# Patient Record
Sex: MALE | Race: WHITE | NOT HISPANIC OR LATINO | URBAN - METROPOLITAN AREA
[De-identification: names, ages, dates, MRNs, and addresses within clinical notes are randomized per-mention and may not be internally consistent; named-entity substitution may affect disease eponyms.]

---

## 2018-08-30 ENCOUNTER — OFFICE (OUTPATIENT)
Dept: URBAN - METROPOLITAN AREA CLINIC 101 | Facility: CLINIC | Age: 73
End: 2018-08-30
Payer: COMMERCIAL

## 2018-08-30 VITALS
WEIGHT: 205 LBS | HEIGHT: 71 IN | TEMPERATURE: 97.9 F | DIASTOLIC BLOOD PRESSURE: 85 MMHG | HEART RATE: 70 BPM | SYSTOLIC BLOOD PRESSURE: 155 MMHG

## 2018-08-30 DIAGNOSIS — R43.2 PARAGEUSIA: ICD-10-CM

## 2018-08-30 DIAGNOSIS — K22.70 BARRETT'S ESOPHAGUS WITHOUT DYSPLASIA: ICD-10-CM

## 2018-08-30 DIAGNOSIS — K21.9 GASTRO-ESOPHAGEAL REFLUX DISEASE WITHOUT ESOPHAGITIS: ICD-10-CM

## 2018-08-30 PROCEDURE — 99203 OFFICE O/P NEW LOW 30 MIN: CPT

## 2018-08-30 RX ORDER — ESOMEPRAZOLE MAGNESIUM 40 MG/1
CAPSULE, DELAYED RELEASE ORAL
Qty: 90 | Refills: 3 | Status: COMPLETED
Start: 2018-08-30 | End: 2018-12-18

## 2018-10-23 ENCOUNTER — OFFICE (OUTPATIENT)
Dept: URBAN - METROPOLITAN AREA CLINIC 101 | Facility: CLINIC | Age: 73
End: 2018-10-23
Payer: COMMERCIAL

## 2018-10-23 VITALS
DIASTOLIC BLOOD PRESSURE: 89 MMHG | TEMPERATURE: 97.9 F | HEART RATE: 54 BPM | HEIGHT: 71 IN | SYSTOLIC BLOOD PRESSURE: 146 MMHG | WEIGHT: 207 LBS

## 2018-10-23 DIAGNOSIS — R10.13 EPIGASTRIC PAIN: ICD-10-CM

## 2018-10-23 DIAGNOSIS — K64.9 UNSPECIFIED HEMORRHOIDS: ICD-10-CM

## 2018-10-23 DIAGNOSIS — K22.70 BARRETT'S ESOPHAGUS WITHOUT DYSPLASIA: ICD-10-CM

## 2018-10-23 DIAGNOSIS — R63.0 ANOREXIA: ICD-10-CM

## 2018-10-23 DIAGNOSIS — R14.2 ERUCTATION: ICD-10-CM

## 2018-10-23 DIAGNOSIS — K21.9 GASTRO-ESOPHAGEAL REFLUX DISEASE WITHOUT ESOPHAGITIS: ICD-10-CM

## 2018-10-23 DIAGNOSIS — R43.2 PARAGEUSIA: ICD-10-CM

## 2018-10-23 PROCEDURE — 99214 OFFICE O/P EST MOD 30 MIN: CPT

## 2018-10-23 RX ORDER — HYDROCORTISONE ACETATE 25 MG/1
SUPPOSITORY RECTAL
Qty: 28 | Refills: 3 | Status: ACTIVE
Start: 2018-10-23

## 2018-10-23 NOTE — SERVICEHPINOTES
ZORAIDA CHANG   is a   73   male who complains of stomach pain, acid reflux, and loss of appetite. He notes epigastric pain described as "dull/burning," occurs 2-3x/week, lasting hours, improves with po intake. He has associates symptoms: indigestion, sour taste in mouth, and decreased of appetite (x 4 months ago). He was recently switched from Protonix to Nexium 40 mg qd since 08/30/2018. He states the Nexium has helped the reflux but not improved the excessive belching or stomach pain. He has h/o Miranda's and his last EGD was in [place] 2015/2016. NSAID use chronic including ibuprofen 2 pills q AM x many years taken "on empty stomach" for arthritis. Denies n/v, cough, voice change, dysphagia, odynophagia, melena, blood in stool, melena, change in bowel habits, weight loss. He has h/o hemorrhoids that have been "stable." He takes Metamucil qd that helps bulk up his stools. He has BMs 4x/day, BSS type 4. He mentions mild anal "burning/itching" sensation that occurs 1-2x/week No pain with defecation. He has tried Anusol rx that helps and he requests refills (prefers suppsitories).

## 2018-11-01 ENCOUNTER — ON CAMPUS - OUTPATIENT (OUTPATIENT)
Dept: URBAN - METROPOLITAN AREA HOSPITAL 14 | Facility: HOSPITAL | Age: 73
End: 2018-11-01
Payer: COMMERCIAL

## 2018-11-01 DIAGNOSIS — R14.0 ABDOMINAL DISTENSION (GASEOUS): ICD-10-CM

## 2018-11-01 DIAGNOSIS — K29.60 OTHER GASTRITIS WITHOUT BLEEDING: ICD-10-CM

## 2018-11-01 DIAGNOSIS — K21.9 GASTRO-ESOPHAGEAL REFLUX DISEASE WITHOUT ESOPHAGITIS: ICD-10-CM

## 2018-11-01 DIAGNOSIS — R10.13 EPIGASTRIC PAIN: ICD-10-CM

## 2018-11-01 DIAGNOSIS — K20.8 OTHER ESOPHAGITIS: ICD-10-CM

## 2018-11-01 PROCEDURE — 43239 EGD BIOPSY SINGLE/MULTIPLE: CPT

## 2018-12-18 ENCOUNTER — OFFICE (OUTPATIENT)
Dept: URBAN - METROPOLITAN AREA CLINIC 101 | Facility: CLINIC | Age: 73
End: 2018-12-18
Payer: COMMERCIAL

## 2018-12-18 VITALS
SYSTOLIC BLOOD PRESSURE: 162 MMHG | TEMPERATURE: 97.9 F | HEIGHT: 71 IN | HEART RATE: 63 BPM | WEIGHT: 212 LBS | DIASTOLIC BLOOD PRESSURE: 85 MMHG

## 2018-12-18 DIAGNOSIS — K64.8 OTHER HEMORRHOIDS: ICD-10-CM

## 2018-12-18 DIAGNOSIS — K21.0 GASTRO-ESOPHAGEAL REFLUX DISEASE WITH ESOPHAGITIS: ICD-10-CM

## 2018-12-18 PROCEDURE — 99214 OFFICE O/P EST MOD 30 MIN: CPT

## 2018-12-18 NOTE — SERVICEHPINOTES
ZORAIDA CHANG   is a   73   male who is here for f/u for reflux and hemorrhoids. His recent EGD found mild chronic gastritis bx neg h pylori. He reports Omeprazole 40 mg qd provides well control of GERD as evident by rare breakthrough symptoms. He notes change from Nexium 20 mg to Omeprazole 40 mg has improved his belching and resolved his stomach pains. He also cut down on ibuprofen and tried to use acetaminophen instead of NSAIDs. Denies n/v, dysphagia/odynophagia, melena, blood in stool, change in bowel habits, weight loss. He is currently having BMs 2-3x/day, BSS type 4. He has h/o hemorrhoids that have been "stable." He takes Metamucil qd that helps bulk up his stools. He notes resolution of anal "burning/itching" sensation with use of Anusol prn that helps and no longer using it for the past 2 weeks. Last colonoscopy [facility] (requested records).

## 2019-12-10 ENCOUNTER — OFFICE (OUTPATIENT)
Dept: URBAN - METROPOLITAN AREA CLINIC 101 | Facility: CLINIC | Age: 74
End: 2019-12-10
Payer: COMMERCIAL

## 2019-12-10 VITALS
HEART RATE: 45 BPM | HEIGHT: 71 IN | WEIGHT: 214 LBS | SYSTOLIC BLOOD PRESSURE: 154 MMHG | TEMPERATURE: 97.9 F | DIASTOLIC BLOOD PRESSURE: 81 MMHG

## 2019-12-10 DIAGNOSIS — K64.9 UNSPECIFIED HEMORRHOIDS: ICD-10-CM

## 2019-12-10 DIAGNOSIS — K22.70 BARRETT'S ESOPHAGUS WITHOUT DYSPLASIA: ICD-10-CM

## 2019-12-10 DIAGNOSIS — K21.0 GASTRO-ESOPHAGEAL REFLUX DISEASE WITH ESOPHAGITIS: ICD-10-CM

## 2019-12-10 PROCEDURE — 99214 OFFICE O/P EST MOD 30 MIN: CPT

## 2019-12-10 RX ORDER — OMEPRAZOLE 40 MG/1
40 CAPSULE, DELAYED RELEASE ORAL
Qty: 90 | Refills: 3 | Status: ACTIVE
Start: 2018-11-01

## 2025-03-21 NOTE — SERVICEHPINOTES
ZORAIDA CHANG   is a   75 yo male who is here for f/u GERD visit and rx refill for PPI. His last 11/2018 EGD found mild chronic gastritis bx neg h pylori. He reports Omeprazole 40 mg qd provides well control of GERD as evident by rare breakthrough symptoms. He notes change from Nexium 20 mg to Omeprazole 40 mg has improved his belching and resolved his stomach pains. He also cut down on ibuprofen and tried to use acetaminophen instead of NSAIDs. Denies n/v, dysphagia/odynophagia, melena, blood in stool, change in bowel habits, weight loss. He is currently having BMs 2-3x/day, BSS type 4 predominately. He has h/o hemorrhoids that have been "stable." He takes Metamucil qd that helps bulk up his stools. He notes resolution of anal "burning/itching" sensation with use of Anusol prn that helps. Last colonoscopy in 2017 at [facility] per patient report. BR -Continue with all your medicines as prescribed  -Continue with your home blood pressure log.  Goal blood pressures less than 140/90, follow-up with Dr. Gimenez as you may want to add an additional medicine to treat your blood pressure  -Will plan to check other labs like hemoglobin A1c and lipid panel later this year 11/2025  -Pursue your current avenues for finding a dietitian you trust and that aligns with your ideologies.  If you find that you are having a hard time establishing with 1, let me know via phone call or [employer] message and I can put a referral to the dietitian  -3-month follow-up or sooner if needed